# Patient Record
Sex: FEMALE
[De-identification: names, ages, dates, MRNs, and addresses within clinical notes are randomized per-mention and may not be internally consistent; named-entity substitution may affect disease eponyms.]

---

## 2019-03-04 NOTE — HP
PREOPERATIVE HISTORY AND PHYSICAL:

 

DATE OF ADMISSION/SURGERY:  03/05/19 [facility]

 

DATE OF OFFICE VISIT/ENCOUNTER:  03/04/19

 

ATTENDING SURGEON:  Elissa Hines MD.* (DICTATED BY ALONDRA BLAS)

 

PROCEDURE:  Open reduction and internal fixation, right fifth metacarpal.

 

CHIEF COMPLAINT:  Right fifth metacarpal fracture.

 

HISTORY OF PRESENT ILLNESS:  This is a 18-year-old female, [employer] 
student, who injured her right hand approximately two weeks ago.  She was 
riding a horse and fell off and hit her right hand on a concrete wall.  She was 
seen initially at [facility] and had some x-rays of the hand which 
showed a fifth metacarpal fracture.  She was referred to Dr. Hines for further 
evaluation and treatment considerations.  She denies any associated numbness or 
tingling.  This is her dominant hand.  After evaluation by Dr. Hines and review 
of x-rays, it has been recommended that she undergo surgical intervention for 
best outcome.  The patient has consented to proceed.

 

PAST MEDICAL HISTORY:  Anxiety/depression.

 

PAST SURGICAL HISTORY:  Yoder teeth extraction.

 

CURRENT MEDICATIONS:

1.  Fluoxetine HCl 10 mg daily.

2.  Norethindrone 0.35 mg daily.

 

ALLERGIES:  No known drug allergies.

 

FAMILY MEDICAL HISTORY:  Stroke and cancer.

 

SOCIAL HISTORY:  The patient is a sophomore at [employer], studying 
Anthropology.  She denies tobacco use and recreational drug use.  She drinks 
alcohol on occasion.

 

REVIEW OF SYSTEMS:  Negative for general, cephalic, cardiovascular, respiratory
, GI, , other musculoskeletal, integumentary, endocrine, neurologic, and 
hematologic symptoms.  Infectious disease is negative for MRSA, hepatitis C, 
HIV.

 

                               PHYSICAL EXAMINATION

 

GENERAL:  A well-developed, well-nourished, 18-year-old female, in no acute 
distress.

 

VITAL SIGNS:  Height 5 feet 6 inches, weight 135 pounds.  Pulse rate 60, blood 
pressure 98/66.

 

HEENT:  Normocephalic, atraumatic.  Pupils are equal, round, and reactive to 
light and accommodation.  Throat is clear.

 

NECK:  Supple.  No palpable lymph nodes.

 

PULMONARY:  Lungs are clear to auscultation bilaterally.  No wheezes, rales, or 
rhonchi.

 

CARDIOVASCULAR:  Regular rate and rhythm.  S1, S2.  No murmurs, rubs, or 
gallops. No edema.

 

ABDOMEN:  Positive bowel sounds, soft, nontender.

 

NEUROLOGICAL:  Alert and oriented x3.  Cranial nerves II through XII are 
intact. Sensation is intact to light touch.

 

MUSCULOSKELETAL:  On exam of the right hand, she had significant swelling and 
ecchymosis.  When she presses her little finger, it underlaps her ring finger.  
She has good extension in the pinkie finger.  Neurovascular function is intact.

 

SKIN:  Intact.

 

 IMAGING STUDIES:  X-rays, AP, lateral and oblique of the right hand shows a 
long oblique fracture of the fifth metacarpal with some angular deformity.

 

IMPRESSION:  Right fifth metacarpal fracture.

 

PLAN:  The patient is scheduled to undergo open reduction and internal fixation 
of the right fifth metacarpal with Dr. Hines on 03/05/19.  She will return to 
the office 10 days' postop for followup and suture removal.  A prescription for 
Tylenol No. 3 was e-scribed to the patient's pharmacy for postoperative pain 
management.

 

____________________________________ ALONDRA BLAS

 

[id number]/[facility] #: [id number]

MTDD

## 2019-03-05 ENCOUNTER — HOSPITAL ENCOUNTER (OUTPATIENT)
Dept: HOSPITAL 25 - OREAST | Age: 19
Discharge: HOME | End: 2019-03-05
Attending: ORTHOPAEDIC SURGERY
Payer: COMMERCIAL

## 2019-03-05 VITALS — DIASTOLIC BLOOD PRESSURE: 68 MMHG | SYSTOLIC BLOOD PRESSURE: 111 MMHG

## 2019-03-05 DIAGNOSIS — S62.326A: Primary | ICD-10-CM
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
## 2019-03-05 DIAGNOSIS — F41.8: ICD-10-CM

## 2019-03-05 PROCEDURE — C1713 ANCHOR/SCREW BN/BN,TIS/BN: HCPCS

## 2019-03-05 PROCEDURE — 81025 URINE PREGNANCY TEST: CPT

## 2019-03-05 PROCEDURE — 76000 FLUOROSCOPY <1 HR PHYS/QHP: CPT

## 2019-03-05 NOTE — OP
DATE OF OPERATION:  03/05/19 [facility]

 

DATE OF BIRTH:  [birth year].

 

SURGEON:  Elissa Hines MD.

 

ASSISTANT:  ALONDRA Sanders.

 

ANESTHESIA:  General.

 

PRE-OP DIAGNOSIS:  Right fifth metacarpal fracture, displaced.

 

POST-OP DIAGNOSIS:  Right fifth metacarpal fracture, displaced.

 

OPERATIVE PROCEDURE:  Open reduction, internal fixation right fifth metacarpal 
fracture.

 

ESTIMATED BLOOD LOSS:  Zero.

 

TOURNIQUET TIME:  Approximately 25 minutes.

 

INDICATION FOR PROCEDURE:  Britni is a 18-year-old female who injured her right 
hand when she fell off her horse.  She suffered a fracture of her right fifth 
metacarpal.  She has a rotational deformity with flexion and presents for ORIF 
of the right fifth metacarpal.

 

DESCRIPTION OF PROCEDURE:  The patient was brought to the operating room, was 
given a general anesthetic and placed in the supine position on the operating 
table with a tourniquet around her right forearm. The skin of her right hand 
and forearm was prepped and draped in the usual sterile fashion.  The hand and 
forearm were exsanguinated and the tourniquet elevated to 250 mmHg.  A 
longitudinal incision was made along the dorsal ulnar border of the fifth 
metacarpal, dissected through the subcutaneous tissue bluntly.  Branch of the 
ulnar sensory nerve was located and retracted by the surgical assistant, Bijal Mojica.  The little finger extensor tendon was retracted as well.  The 
periosteum of the fifth metacarpal was incised longitudinally, and then the 
fracture fragments were reduced and secured with two 1.5 mm screws, one was 9 
mm and one was 10 mm.  The position of the hardware and fracture fragments were 
checked on the C-arm in the AP and lateral views and found to be anatomic.  The 
wound was copiously irrigated with saline.  The periosteum was repaired over 
the fracture and the screws with 3-0 Vicryl suture.  The skin edges were then 
reapproximated with 4-0 nylon suture.  The wound was dressed with Xeroform, 4x4
, Webril and an Ace wrap.  The patient tolerated the procedure well and was 
brought to the recovery room in good condition.

 

 [id number]/[facility] #: [id number]

[facility]